# Patient Record
Sex: MALE | Race: BLACK OR AFRICAN AMERICAN | NOT HISPANIC OR LATINO
[De-identification: names, ages, dates, MRNs, and addresses within clinical notes are randomized per-mention and may not be internally consistent; named-entity substitution may affect disease eponyms.]

---

## 2020-05-14 ENCOUNTER — APPOINTMENT (OUTPATIENT)
Dept: ORTHOPEDIC SURGERY | Facility: CLINIC | Age: 30
End: 2020-05-14
Payer: COMMERCIAL

## 2020-05-14 VITALS — TEMPERATURE: 97.4 F

## 2020-05-14 PROCEDURE — 99203 OFFICE O/P NEW LOW 30 MIN: CPT

## 2020-05-19 NOTE — DISCUSSION/SUMMARY
[de-identified] : 30-year-old male with right knee pain\par \par The patient presents with clinical symptoms of diffuse anterior knee pain with dysfunction of the patellofemoral compartment. The patient's pain is likely mulitfactorial; including chondromalacia of the patellofemoral compartment, muscle weakness, limb malalignment, patella maltracking. \par \par I discussed at length the following nonoperative modalities of treatment for anterior knee pain/patellofemoral syndrome with the patient that includes anti-inflammatory medication, activity modification, and physical therapy. Physical therapy will include vastus medialis strengthening, close chain short arc quadriceps exercises as well as hip and core strengthening. Surgical intervention is typically reserved for cases refractory to nonoperative management with evidence of malalignment that may include debridement, lateral release versus patellar realignment surgery. \par \par Recommendation; activity modification, NSAIDs as prescribed, ice as needed.\par \par Follow up as needed.

## 2020-05-19 NOTE — PHYSICAL EXAM
[de-identified] : Oriented to time, place, person\par Mood: Normal\par Affect: Normal\par Appearance: Healthy, well appearing, no acute distress.\par Gait: Normal\par Assistive Devices: None\par \par Right knee exam\par \par Skin: Clean, dry, intact\par Inspection: No obvious malalignment, no masses, no swelling, no effusion\par Pulses: 2+ DP/PT pulses\par ROM: 0-135 degrees of flexion.  Mild anterior pain with deep knee flexion/extension.\par Tenderness: No MJLT. No LJLT.  Mild pain over the patella facets. No pain to the quadriceps tendon. No pain to the patella tendon. No posterior knee tenderness.\par Stability: Stable to varus, valgus. Negative lachman testing. Negative anterior drawer, negative posterior drawer.\par Strength: 5/5 Q/H/TA/GS/EHL, without atrophy\par Neuro: In tact to light touch throughout, DTR's normal\par Additional tests: Negative McMurrays test, Negative patellar grind test. \par  [de-identified] : Images were reviewed from  dated 5.12.20. \par \par 2 views right knee showed no evidence of bony injury, or martin dislocation. There is no underlying degenerative arthritic change seen. Overall alignment is maintained. Otherwise unremarkable.

## 2020-05-19 NOTE — HISTORY OF PRESENT ILLNESS
[de-identified] : 30 year old male  presents today with right knee pain since March 31, 2020. He said that he noticed he had pain after coming back from vacation and resuming his shift. Pain improves end of day and returns when starting back his shift. He is not taking pain medication. Reports weakness and stiffness. X-rays done at [facility].  Denies any mechanical symptoms to the knee. Denies radiation of pain.  Pain is localized through the anterior joint.\par \par The patient's past medical history, past surgical history, medications and allergies were reviewed by me today with the patient and documented accordingly. In addition, the patient's family and social history, which were noncontributory to this visit, were reviewed also.

## 2020-05-27 DIAGNOSIS — M25.561 PAIN IN RIGHT KNEE: ICD-10-CM

## 2021-04-27 LAB — SARS-COV-2 N GENE NPH QL NAA+PROBE: NOT DETECTED

## 2022-01-02 LAB — SARS-COV-2 N GENE NPH QL NAA+PROBE: DETECTED

## 2022-01-12 ENCOUNTER — TRANSCRIPTION ENCOUNTER (OUTPATIENT)
Age: 32
End: 2022-01-12

## 2022-03-22 ENCOUNTER — TRANSCRIPTION ENCOUNTER (OUTPATIENT)
Age: 32
End: 2022-03-22

## 2022-04-18 ENCOUNTER — APPOINTMENT (OUTPATIENT)
Dept: INTERNAL MEDICINE | Facility: CLINIC | Age: 32
End: 2022-04-18
Payer: COMMERCIAL

## 2022-04-22 ENCOUNTER — APPOINTMENT (OUTPATIENT)
Dept: INTERNAL MEDICINE | Facility: CLINIC | Age: 32
End: 2022-04-22
Payer: COMMERCIAL

## 2022-04-22 VITALS
WEIGHT: 117 LBS | SYSTOLIC BLOOD PRESSURE: 120 MMHG | DIASTOLIC BLOOD PRESSURE: 70 MMHG | HEART RATE: 90 BPM | BODY MASS INDEX: 19.03 KG/M2 | HEIGHT: 65.75 IN | OXYGEN SATURATION: 98 %

## 2022-04-22 LAB
ALBUMIN SERPL ELPH-MCNC: 4.9 G/DL
ALP BLD-CCNC: 95 U/L
ALT SERPL-CCNC: 23 U/L
ANION GAP SERPL CALC-SCNC: 13 MMOL/L
AST SERPL-CCNC: 26 U/L
BASOPHILS # BLD AUTO: 0.03 K/UL
BASOPHILS NFR BLD AUTO: 0.4 %
BILIRUB SERPL-MCNC: 0.6 MG/DL
BUN SERPL-MCNC: 12 MG/DL
CALCIUM SERPL-MCNC: 10.3 MG/DL
CHLORIDE SERPL-SCNC: 98 MMOL/L
CHOLEST SERPL-MCNC: 173 MG/DL
CO2 SERPL-SCNC: 30 MMOL/L
CREAT SERPL-MCNC: 0.97 MG/DL
EGFR: 108 ML/MIN/1.73M2
EOSINOPHIL # BLD AUTO: 0.04 K/UL
EOSINOPHIL NFR BLD AUTO: 0.6 %
ESTIMATED AVERAGE GLUCOSE: 117 MG/DL
GLUCOSE SERPL-MCNC: 100 MG/DL
HBA1C MFR BLD HPLC: 5.7 %
HCT VFR BLD CALC: 47 %
HCV AB SER QL: NONREACTIVE
HCV S/CO RATIO: 0.15 S/CO
HDLC SERPL-MCNC: 57 MG/DL
HGB BLD-MCNC: 15.2 G/DL
IMM GRANULOCYTES NFR BLD AUTO: 0.4 %
LDLC SERPL CALC-MCNC: 104 MG/DL
LYMPHOCYTES # BLD AUTO: 1.22 K/UL
LYMPHOCYTES NFR BLD AUTO: 17 %
MAN DIFF?: NORMAL
MCHC RBC-ENTMCNC: 28.6 PG
MCHC RBC-ENTMCNC: 32.3 GM/DL
MCV RBC AUTO: 88.3 FL
MONOCYTES # BLD AUTO: 0.68 K/UL
MONOCYTES NFR BLD AUTO: 9.5 %
NEUTROPHILS # BLD AUTO: 5.18 K/UL
NEUTROPHILS NFR BLD AUTO: 72.1 %
NONHDLC SERPL-MCNC: 116 MG/DL
PLATELET # BLD AUTO: 488 K/UL
POTASSIUM SERPL-SCNC: 4.8 MMOL/L
PROT SERPL-MCNC: 8.9 G/DL
RBC # BLD: 5.32 M/UL
RBC # FLD: 12.4 %
SODIUM SERPL-SCNC: 140 MMOL/L
T4 FREE SERPL-MCNC: 1.3 NG/DL
TRIGL SERPL-MCNC: 57 MG/DL
TSH SERPL-ACNC: 1.67 UIU/ML
WBC # FLD AUTO: 7.18 K/UL

## 2022-04-22 PROCEDURE — 99213 OFFICE O/P EST LOW 20 MIN: CPT

## 2022-04-23 ENCOUNTER — NON-APPOINTMENT (OUTPATIENT)
Age: 32
End: 2022-04-23

## 2022-04-23 LAB
C TRACH RRNA SPEC QL NAA+PROBE: NOT DETECTED
HIV1+2 AB SPEC QL IA.RAPID: NONREACTIVE
N GONORRHOEA RRNA SPEC QL NAA+PROBE: NOT DETECTED
SOURCE AMPLIFICATION: NORMAL
T PALLIDUM AB SER QL IA: NEGATIVE

## 2022-04-23 NOTE — ASSESSMENT
[FreeTextEntry1] : REBEKAH SAMUELS  is a 32 year old AA male  with history of childhood only asthma, knee pain presented today for concerns of possible inguinal hernia and left testicular tenderness, occasional rib cage tenderness.\par \par # occasional b/l inguinal and testicular tenderness\par Doubt hernia by exam.\par No gross evidence of STI by exam.\par Check STD screening including HIV, Hep C, Chlamydia/GC, syphilis\par Check routine blood work for loss of appetite and body weight.\par Recommended to use condoms all the time for safety.\par Check routine full lab for upcoming NPA.\par \par # hx of childhood asthma\par Hx of smoking Marijuana but no cigarette\par clear lung sound on exam,\par Rx sent for Albuterol rescue inhalator by pt's request\par \par f/u result. \par Pt should RTC to establish care with a  PCP.

## 2022-04-23 NOTE — HISTORY OF PRESENT ILLNESS
[FreeTextEntry1] : Visit to establish care with new primary care doctor\par  [de-identified] : Visit to establish care with new primary care doctor\par  [FreeTextEntry8] : REBEKAH SAMUELS  is a 32 year old AA male  with history of childhood only asthma, knee pain presented today for concerns of possible inguinal hernia and left testicular tenderness, occasional rib cage tenderness. Reportedly he saw another PCP 6 months ago and told all lab result was okay. He would soon return to our practice to establish care with a new PCP  since he was late for NPA appointment today. He wishes to take care of his acute issues at this encounter. \par \par He reported remote hx of chlamydia with Rx about 7 years ago. Also worried that he lost appetite and body weight. Today's bwt is 117lb comparing to 125 lbs which is pt's normal weight. Denies n/v/c/d or bowel habit change. He denies risky sexual behavior and stated that he's using condoms all the time. He has sexual relationship with only his girlfriend. Denies Uro, GI, dermatology issues. He was not sure which inguinal area had tenderness. He did not see a lump on inguinal area. Reported occasional testicle tenderness and pt worries if its a testicular cyst. \par His job as a  is physically demanding including lifting, carrying, pulling heavy objects. He was not sure if he pulled his back but rt circular rib cage was tender with deep breath or arm movement. For recent 1 month, he had dry cough.  He never smoked cigarettes but used to smoke Marijuana. He frequently donates blood and latest one was 3 month ago. Denies fatigue, fever, chills, CP, radiating pain to neck/shoulder/arm, SOB, leg swelling, n/v/c/d, or  abdominal pain at this time.

## 2022-04-23 NOTE — HISTORY OF PRESENT ILLNESS
[FreeTextEntry1] : Visit to establish care with new primary care doctor\par  [de-identified] : Visit to establish care with new primary care doctor\par  [FreeTextEntry8] : REBEKAH SAMUELS  is a 32 year old AA male  with history of childhood only asthma, knee pain presented today for concerns of possible inguinal hernia and left testicular tenderness, occasional rib cage tenderness. Reportedly he saw another PCP 6 months ago and told all lab result was okay. He would soon return to our practice to establish care with a new PCP  since he was late for NPA appointment today. He wishes to take care of his acute issues at this encounter. \par \par He reported remote hx of chlamydia with Rx about 7 years ago. Also worried that he lost appetite and body weight. Today's bwt is 117lb comparing to 125 lbs which is pt's normal weight. Denies n/v/c/d or bowel habit change. He denies risky sexual behavior and stated that he's using condoms all the time. He has sexual relationship with only his girlfriend. Denies Uro, GI, dermatology issues. He was not sure which inguinal area had tenderness. He did not see a lump on inguinal area. Reported occasional testicle tenderness and pt worries if its a testicular cyst. \par His job as a  is physically demanding including lifting, carrying, pulling heavy objects. He was not sure if he pulled his back but rt circular rib cage was tender with deep breath or arm movement. For recent 1 month, he had dry cough.  He never smoked cigarettes but used to smoke Marijuana. He frequently donates blood and latest one was 3 month ago. Denies fatigue, fever, chills, CP, radiating pain to neck/shoulder/arm, SOB, leg swelling, n/v/c/d, or  abdominal pain at this time.

## 2022-04-23 NOTE — PHYSICAL EXAM
[Normal] : soft, non-tender, non-distended, no masses palpated, no HSM and normal bowel sounds [Urethral Meatus] : meatus normal [Penis Abnormality] : normal uncircumcised penis [Scrotum] : the scrotum was normal [Epididymis] : the epididymides were normal [de-identified] : WDWN in NAD\par HEENT:  unremarkable\par Neck:  supple, no JVD, no LN\par Lungs:  CTA B/L, no W/R/R\par Heart:  Reg rate, +S1S2, no M/R/G\par Abdomen:  soft, NT, ND, +BS, no masses, no HS-megaly\par Genital: No pubic or genital lesions noted.\par Ext:  no C/C/E\par Neuro:  no focal deficits [de-identified] : no lymph node swelling, + tenderness on deep palpation on rt/lt inguinal area. No skin lesions. [de-identified] : No gross edema or tenderness on palapation

## 2022-04-23 NOTE — REVIEW OF SYSTEMS
[FreeTextEntry2] : Constitutional:  no fever and no chills. \par Eyes:  no discharge. \par HEENT:  no earache. \par Cardiovascular:  no chest pain, no palpitations and no lower extremity edema. \par Respiratory:  no shortness of breath, no wheezing and no cough. \par Gastrointestinal:  no abdominal pain, no nausea and no vomiting. \par Genitourinary:  no dysuria. \par Musculoskeletal:  no joint pain. \par Integumentary:  no itching. \par Neurological:  no headache. \par Psychiatric:  not suicidal. \par Hematologic/Lymphatic:  no easy bleeding. [FreeTextEntry6] : see HPI [FreeTextEntry8] : see HPI

## 2022-04-23 NOTE — PHYSICAL EXAM
[Normal] : soft, non-tender, non-distended, no masses palpated, no HSM and normal bowel sounds [Urethral Meatus] : meatus normal [Penis Abnormality] : normal uncircumcised penis [Scrotum] : the scrotum was normal [Epididymis] : the epididymides were normal [de-identified] : WDWN in NAD\par HEENT:  unremarkable\par Neck:  supple, no JVD, no LN\par Lungs:  CTA B/L, no W/R/R\par Heart:  Reg rate, +S1S2, no M/R/G\par Abdomen:  soft, NT, ND, +BS, no masses, no HS-megaly\par Genital: No pubic or genital lesions noted.\par Ext:  no C/C/E\par Neuro:  no focal deficits [de-identified] : no lymph node swelling, + tenderness on deep palpation on rt/lt inguinal area. No skin lesions. [de-identified] : No gross edema or tenderness on palapation

## 2022-04-27 ENCOUNTER — APPOINTMENT (OUTPATIENT)
Dept: DERMATOLOGY | Facility: CLINIC | Age: 32
End: 2022-04-27

## 2022-04-28 ENCOUNTER — APPOINTMENT (OUTPATIENT)
Dept: ULTRASOUND IMAGING | Facility: CLINIC | Age: 32
End: 2022-04-28
Payer: COMMERCIAL

## 2022-04-28 ENCOUNTER — OUTPATIENT (OUTPATIENT)
Dept: OUTPATIENT SERVICES | Facility: HOSPITAL | Age: 32
LOS: 1 days | End: 2022-04-28
Payer: COMMERCIAL

## 2022-04-28 DIAGNOSIS — N50.812 LEFT TESTICULAR PAIN: ICD-10-CM

## 2022-04-28 PROCEDURE — 76870 US EXAM SCROTUM: CPT

## 2022-04-28 PROCEDURE — 76870 US EXAM SCROTUM: CPT | Mod: 26

## 2022-05-04 ENCOUNTER — APPOINTMENT (OUTPATIENT)
Dept: INTERNAL MEDICINE | Facility: CLINIC | Age: 32
End: 2022-05-04
Payer: COMMERCIAL

## 2022-05-04 PROCEDURE — 99212 OFFICE O/P EST SF 10 MIN: CPT

## 2022-05-04 NOTE — HISTORY OF PRESENT ILLNESS
[de-identified] : RTC to review results of recent US and blood tests. \par Recent lab results reviewed.\par All normal. \par US was normal as well.  Groin pain subsequently resolve.d

## 2022-05-09 ENCOUNTER — APPOINTMENT (OUTPATIENT)
Dept: INTERNAL MEDICINE | Facility: CLINIC | Age: 32
End: 2022-05-09

## 2022-10-06 ENCOUNTER — APPOINTMENT (OUTPATIENT)
Dept: INTERNAL MEDICINE | Facility: CLINIC | Age: 32
End: 2022-10-06

## 2022-10-06 DIAGNOSIS — J45.909 UNSPECIFIED ASTHMA, UNCOMPLICATED: ICD-10-CM

## 2022-10-06 PROCEDURE — 99213 OFFICE O/P EST LOW 20 MIN: CPT

## 2022-10-07 NOTE — HISTORY OF PRESENT ILLNESS
[Follow-Up - Routine Clinic] : a routine clinic follow-up of [Dyspnea on Exertion] : dyspnea on exertion [Wheezing] : wheezing [Chest Tightness] : chest tightness [Productive Cough] : no productive cough [Non-Productive Cough] : non-productive cough [Chest Pain] : no chest pain [Currently Experiencing] : The patient is currently experiencing symptoms. [Sudden] : sudden [___ Week(s) Ago] : [unfilled] week(s) ago [Change in Weather] : a change in weather [Orthopnea] : no orthopnea [Fever] : no fever [Chills] : no chills [Nasal Congestion] : no nasal congestion [Sneezing] : no sneezing

## 2022-10-07 NOTE — ASSESSMENT
[FreeTextEntry1] : Responded well to nebulized albuterol\par \par 24 minutes spent in preparation of this visit, including review of previous notes and test results, interview and examination of patient, discussion of plan, arranging for appropriate testing and treatment, and documentation.\par

## 2022-10-26 ENCOUNTER — APPOINTMENT (OUTPATIENT)
Dept: UROLOGY | Facility: CLINIC | Age: 32
End: 2022-10-26

## 2022-10-26 DIAGNOSIS — Z78.9 OTHER SPECIFIED HEALTH STATUS: ICD-10-CM

## 2022-10-26 PROCEDURE — 99203 OFFICE O/P NEW LOW 30 MIN: CPT

## 2022-10-26 RX ORDER — ALBUTEROL SULFATE 90 UG/1
108 (90 BASE) INHALANT RESPIRATORY (INHALATION)
Qty: 2 | Refills: 1 | Status: COMPLETED | COMMUNITY
Start: 2022-04-22 | End: 2022-10-26

## 2022-10-26 RX ORDER — NAPROXEN 500 MG/1
500 TABLET ORAL
Qty: 30 | Refills: 0 | Status: COMPLETED | COMMUNITY
Start: 2020-05-14 | End: 2022-10-26

## 2022-10-27 NOTE — ASSESSMENT
[FreeTextEntry1] : Exam findings noted and discussed with the patient.\par Reviewed potential etiologies including cyst and neoplasm.\par Recommend Scrotal US and will call with results.

## 2022-10-27 NOTE — HISTORY OF PRESENT ILLNESS
[FreeTextEntry1] : Patient is a 32 year old man comes in today for bilateral testicular nodules\par He first noticed them about 4 - 5 months ago accompanied with right groin pain \par He feels that the right side is larger, slightly bigger than a quarter and feels the left is about the size of a sharmin. \par \par Denies any bothersome urinary issues. Denies any gross hematuria \par Denies any childhood  issues \par \par Scrotal US 4/28/22: Normal Testis, normal epididymis.\par \par Feels that there has been growth in bilateral scrotal masses over the past 6 months.

## 2022-10-27 NOTE — PHYSICAL EXAM
[General Appearance - Well Developed] : well developed [Normal Appearance] : normal appearance [Edema] : no peripheral edema [Exaggerated Use Of Accessory Muscles For Inspiration] : no accessory muscle use [Abdomen Tenderness] : non-tender [Penis Abnormality] : normal uncircumcised penis [Normal Station and Gait] : the gait and station were normal for the patient's age [] : no rash [No Focal Deficits] : no focal deficits [Oriented To Time, Place, And Person] : oriented to person, place, and time [Affect] : the affect was normal [Urethral Meatus] : meatus normal [Testes Tenderness] : no tenderness of the testes [Testes Mass (___cm)] : there were no testicular masses [FreeTextEntry1] : Bilateral nodular scrotal masses, appears to be arising from the epididymis.  Largest measure about 1.5 cm. [No Palpable Adenopathy] : no palpable adenopathy

## 2022-10-30 ENCOUNTER — OUTPATIENT (OUTPATIENT)
Dept: OUTPATIENT SERVICES | Facility: HOSPITAL | Age: 32
LOS: 1 days | End: 2022-10-30
Payer: COMMERCIAL

## 2022-10-30 ENCOUNTER — APPOINTMENT (OUTPATIENT)
Dept: ULTRASOUND IMAGING | Facility: IMAGING CENTER | Age: 32
End: 2022-10-30

## 2022-10-30 DIAGNOSIS — Z00.8 ENCOUNTER FOR OTHER GENERAL EXAMINATION: ICD-10-CM

## 2022-10-30 DIAGNOSIS — N50.3 CYST OF EPIDIDYMIS: ICD-10-CM

## 2022-10-30 PROCEDURE — 76870 US EXAM SCROTUM: CPT

## 2022-10-30 PROCEDURE — 76870 US EXAM SCROTUM: CPT | Mod: 26

## 2022-11-03 ENCOUNTER — OUTPATIENT (OUTPATIENT)
Dept: OUTPATIENT SERVICES | Facility: HOSPITAL | Age: 32
LOS: 1 days | End: 2022-11-03

## 2022-11-03 VITALS
HEART RATE: 81 BPM | TEMPERATURE: 97 F | SYSTOLIC BLOOD PRESSURE: 100 MMHG | OXYGEN SATURATION: 97 % | WEIGHT: 115.96 LBS | RESPIRATION RATE: 16 BRPM | HEIGHT: 67 IN | DIASTOLIC BLOOD PRESSURE: 70 MMHG

## 2022-11-03 DIAGNOSIS — N50.3 CYST OF EPIDIDYMIS: ICD-10-CM

## 2022-11-03 LAB
ANION GAP SERPL CALC-SCNC: 12 MMOL/L — SIGNIFICANT CHANGE UP (ref 7–14)
BUN SERPL-MCNC: 14 MG/DL — SIGNIFICANT CHANGE UP (ref 7–23)
CALCIUM SERPL-MCNC: 9 MG/DL — SIGNIFICANT CHANGE UP (ref 8.4–10.5)
CHLORIDE SERPL-SCNC: 104 MMOL/L — SIGNIFICANT CHANGE UP (ref 98–107)
CO2 SERPL-SCNC: 25 MMOL/L — SIGNIFICANT CHANGE UP (ref 22–31)
CREAT SERPL-MCNC: 0.95 MG/DL — SIGNIFICANT CHANGE UP (ref 0.5–1.3)
EGFR: 110 ML/MIN/1.73M2 — SIGNIFICANT CHANGE UP
GLUCOSE SERPL-MCNC: 91 MG/DL — SIGNIFICANT CHANGE UP (ref 70–99)
HCT VFR BLD CALC: 43.2 % — SIGNIFICANT CHANGE UP (ref 39–50)
HGB BLD-MCNC: 14.4 G/DL — SIGNIFICANT CHANGE UP (ref 13–17)
MCHC RBC-ENTMCNC: 28.6 PG — SIGNIFICANT CHANGE UP (ref 27–34)
MCHC RBC-ENTMCNC: 33.3 GM/DL — SIGNIFICANT CHANGE UP (ref 32–36)
MCV RBC AUTO: 85.9 FL — SIGNIFICANT CHANGE UP (ref 80–100)
NRBC # BLD: 0 /100 WBCS — SIGNIFICANT CHANGE UP (ref 0–0)
NRBC # FLD: 0 K/UL — SIGNIFICANT CHANGE UP (ref 0–0)
PLATELET # BLD AUTO: 328 K/UL — SIGNIFICANT CHANGE UP (ref 150–400)
POTASSIUM SERPL-MCNC: 4.1 MMOL/L — SIGNIFICANT CHANGE UP (ref 3.5–5.3)
POTASSIUM SERPL-SCNC: 4.1 MMOL/L — SIGNIFICANT CHANGE UP (ref 3.5–5.3)
RBC # BLD: 5.03 M/UL — SIGNIFICANT CHANGE UP (ref 4.2–5.8)
RBC # FLD: 13.2 % — SIGNIFICANT CHANGE UP (ref 10.3–14.5)
SODIUM SERPL-SCNC: 141 MMOL/L — SIGNIFICANT CHANGE UP (ref 135–145)
WBC # BLD: 4.18 K/UL — SIGNIFICANT CHANGE UP (ref 3.8–10.5)
WBC # FLD AUTO: 4.18 K/UL — SIGNIFICANT CHANGE UP (ref 3.8–10.5)

## 2022-11-03 RX ORDER — SODIUM CHLORIDE 9 MG/ML
1000 INJECTION, SOLUTION INTRAVENOUS
Refills: 0 | Status: DISCONTINUED | OUTPATIENT
Start: 2022-11-08 | End: 2022-11-22

## 2022-11-03 RX ORDER — ALBUTEROL 90 UG/1
2 AEROSOL, METERED ORAL
Qty: 0 | Refills: 0 | DISCHARGE

## 2022-11-03 NOTE — H&P PST ADULT - NSICDXPASTMEDICALHX_GEN_ALL_CORE_FT
PAST MEDICAL HISTORY:  Asthma     No pertinent past medical history      PAST MEDICAL HISTORY:  Asthma     Cyst of epididymis

## 2022-11-03 NOTE — H&P PST ADULT - NSANTHOSAYNRD_GEN_A_CORE
No. [place] screening performed.  STOP BANG Legend: 0-2 = LOW Risk; 3-4 = INTERMEDIATE Risk; 5-8 = HIGH Risk

## 2022-11-03 NOTE — H&P PST ADULT - PROBLEM SELECTOR PLAN 1
Pt is scheduled for bilateral scrotal exploration, resection of bilateral epididymal masses on 11/8/22.  Verbal and written pre op instructions reviewed with patient and pt able to verbalize understanding.  Pt instructed to follow surgeon's guidelines regarding COVID testing preop.

## 2022-11-03 NOTE — H&P PST ADULT - HISTORY OF PRESENT ILLNESS
33 yo male with preop dx. cyst of epididymis presents to pre surgical testing.  Pt reports he noticed masses on his testes 5-6 months ago, pt now complains of new right pelvic pain, s/p MRI.   Pt is scheduled for bilateral scrotal exploration, resection of bilateral epididymal masses.

## 2022-11-03 NOTE — H&P PST ADULT - OCCUPATION
As discussed, Sylvia who was seen on 8/17 called her insurance company to get a new CPap because the one she called is on recall.  They told her they need a new order for the C-Pap.  She wants to know if Dr Sandoval can order for her.  Please advise.  Thank you!!  
Pt states her PCP gave her the original order for the CPAP machine. Informed pt she would need to contact her PCP for the CPAP machine as we do not order or manage those. Pt verbalizes understanding and states she will call her PCP   
materials [facility], [place]

## 2022-11-07 ENCOUNTER — TRANSCRIPTION ENCOUNTER (OUTPATIENT)
Age: 32
End: 2022-11-07

## 2022-11-07 NOTE — ASU PATIENT PROFILE, ADULT - AS SC BRADEN MOBILITY
Detail Level: Simple
Instructions: This plan will send the code FBSE to the PM system.  DO NOT or CHANGE the price.
Price (Do Not Change): 0.00
(4) no limitation

## 2022-11-07 NOTE — ASU PATIENT PROFILE, ADULT - FALL HARM RISK - UNIVERSAL INTERVENTIONS
Bed in lowest position, wheels locked, appropriate side rails in place/Call bell, personal items and telephone in reach/Instruct patient to call for assistance before getting out of bed or chair/Non-slip footwear when patient is out of bed/[place] to call system/Physically safe environment - no spills, clutter or unnecessary equipment/Purposeful Proactive Rounding/Room/bathroom lighting operational, light cord in reach

## 2022-11-08 ENCOUNTER — TRANSCRIPTION ENCOUNTER (OUTPATIENT)
Age: 32
End: 2022-11-08

## 2022-11-08 ENCOUNTER — APPOINTMENT (OUTPATIENT)
Dept: UROLOGY | Facility: HOSPITAL | Age: 32
End: 2022-11-08

## 2022-11-08 ENCOUNTER — RESULT REVIEW (OUTPATIENT)
Age: 32
End: 2022-11-08

## 2022-11-08 ENCOUNTER — OUTPATIENT (OUTPATIENT)
Dept: OUTPATIENT SERVICES | Facility: HOSPITAL | Age: 32
LOS: 1 days | Discharge: HOME | End: 2022-11-08

## 2022-11-08 VITALS
SYSTOLIC BLOOD PRESSURE: 114 MMHG | RESPIRATION RATE: 18 BRPM | HEART RATE: 76 BPM | TEMPERATURE: 98 F | OXYGEN SATURATION: 100 % | WEIGHT: 115.96 LBS | HEIGHT: 67 IN | DIASTOLIC BLOOD PRESSURE: 73 MMHG

## 2022-11-08 VITALS
HEART RATE: 62 BPM | RESPIRATION RATE: 15 BRPM | OXYGEN SATURATION: 100 % | SYSTOLIC BLOOD PRESSURE: 119 MMHG | DIASTOLIC BLOOD PRESSURE: 82 MMHG

## 2022-11-08 DIAGNOSIS — N50.3 CYST OF EPIDIDYMIS: ICD-10-CM

## 2022-11-08 LAB — SARS-COV-2 N GENE NPH QL NAA+PROBE: NOT DETECTED

## 2022-11-08 PROCEDURE — 54830 REMOVE EPIDIDYMIS LESION: CPT | Mod: 50

## 2022-11-08 RX ORDER — SODIUM CHLORIDE 9 MG/ML
1000 INJECTION, SOLUTION INTRAVENOUS
Refills: 0 | Status: DISCONTINUED | OUTPATIENT
Start: 2022-11-08 | End: 2022-11-22

## 2022-11-08 RX ORDER — FENTANYL CITRATE 50 UG/ML
50 INJECTION INTRAVENOUS
Refills: 0 | Status: DISCONTINUED | OUTPATIENT
Start: 2022-11-08 | End: 2022-11-08

## 2022-11-08 RX ORDER — OXYCODONE HYDROCHLORIDE 5 MG/1
5 TABLET ORAL ONCE
Refills: 0 | Status: DISCONTINUED | OUTPATIENT
Start: 2022-11-08 | End: 2022-11-08

## 2022-11-08 RX ORDER — FENTANYL CITRATE 50 UG/ML
25 INJECTION INTRAVENOUS
Refills: 0 | Status: DISCONTINUED | OUTPATIENT
Start: 2022-11-08 | End: 2022-11-08

## 2022-11-08 RX ORDER — ONDANSETRON 8 MG/1
4 TABLET, FILM COATED ORAL ONCE
Refills: 0 | Status: DISCONTINUED | OUTPATIENT
Start: 2022-11-08 | End: 2022-11-22

## 2022-11-08 RX ADMIN — SODIUM CHLORIDE 125 MILLILITER(S): 9 INJECTION, SOLUTION INTRAVENOUS at 16:51

## 2022-11-08 RX ADMIN — OXYCODONE HYDROCHLORIDE 5 MILLIGRAM(S): 5 TABLET ORAL at 16:51

## 2022-11-08 NOTE — ASU DISCHARGE PLAN (ADULT/PEDIATRIC) - ASU DC SPECIAL INSTRUCTIONSFT
WOUND CARE: Your sutures will dissolve on their own. You should apply bacitracin (available over the counter) to the incision 3 times a day for the first week. It is normal to see swelling. Some spotting or bleeding from the incision is normal. If the bleeding worsens or saturates the dressing, please call your urologist. You may remove your supportive underwear on Thursday.  BATHING: You may shower after the underwear is removed. Do not soak in bathtub/pool/etc until you are cleared by your urologist at your post-operative appointment.  DIET: You may resume your regular diet and regular medication regimen.  PAIN: You may take Tylenol (acetaminophen) 650-975mg and/or Motrin (ibuprofen) 400-600mg, both available over the counter, for pain every 6 hours as needed. Do not exceed 4000mg of Tylenol (acetaminophen) daily. You may alternate these medications such that you take one or the other every 3 hours for around the clock pain coverage.  ANTIBIOTICS: You do not need antibiotics.  STOOL SOFTENERS: Do not allow yourself to become constipated as straining may cause bleeding. Take stool softeners or a laxative (ex. Miralax, Colace, Senokot, ExLax, etc), available over the counter, if needed.  ACTIVITY: No heavy lifting, strenuous exercise, straddle activities (bicycle), or sexual activity (including masturbation) until you are evaluated at your post-operative appointment. Otherwise, you may return to your usual level of physical activity.  FOLLOW-UP: If you did not already schedule your post-operative appointment, please call your urologist to schedule and follow-up appointment.  CALL YOUR UROLOGIST IF: You have any bleeding that does not stop, inability to void >8 hours, fever over 100.4 F, chills, persistent nausea/vomiting, changes in your incision concerning for infection, or if your pain is not controlled on your discharge pain medications.

## 2022-11-08 NOTE — ASU DISCHARGE PLAN (ADULT/PEDIATRIC) - NS MD DC FALL RISK RISK
For information on Fall & Injury Prevention, visit: [email or web address] OR  [email or web address] OR  https://www.cdc.gov/steadi/patient.html

## 2022-11-08 NOTE — ASU DISCHARGE PLAN (ADULT/PEDIATRIC) - NURSING INSTRUCTIONS
DO NOT take any Tylenol (Acetaminophen) or narcotics containing Tylenol until after 8pm. You received Tylenol during your operation and it can cause damage to your liver if too much is taken within a 24 hour time period.

## 2022-11-08 NOTE — PACU DISCHARGE NOTE - COMMENTS
T(C): 36.3 (11-08-22 @ 17:31), Max: 36.5 (11-08-22 @ 10:53)  HR: 62 (11-08-22 @ 17:51) (61 - 85)  BP: 119/82 (11-08-22 @ 17:51) (113/78 - 127/79)  RR: 15 (11-08-22 @ 17:51) (15 - 20)  SpO2: 100% (11-08-22 @ 17:51) (99% - 100%)    Vital signs stable, non-labored breathing with adequate oxygen saturation on room air. Pain and nausea are controlled. All questions answered. Stable for discharge home with an escort.

## 2022-11-08 NOTE — ASU DISCHARGE PLAN (ADULT/PEDIATRIC) - CARE PROVIDER_API CALL
Bayron Ahuja)  Urology  [address] [place] [address], [place] [address]  Phone: [phone]  Fax: [phone]  Established Patient  Follow Up Time:

## 2022-11-09 LAB
NIGHT BLUE STAIN TISS: SIGNIFICANT CHANGE UP
SPECIMEN SOURCE: SIGNIFICANT CHANGE UP

## 2022-11-16 LAB — SURGICAL PATHOLOGY STUDY: SIGNIFICANT CHANGE UP

## 2022-12-07 LAB
CULTURE RESULTS: SIGNIFICANT CHANGE UP
SPECIMEN SOURCE: SIGNIFICANT CHANGE UP

## 2022-12-21 LAB
CULTURE RESULTS: SIGNIFICANT CHANGE UP
SPECIMEN SOURCE: SIGNIFICANT CHANGE UP

## 2023-01-22 NOTE — H&P PST ADULT - NEGATIVE CARDIOVASCULAR SYMPTOMS
Patient is a 88y old  Female from home, lives with daughter, uses a walker, with medical history significant for HTN, HLD, DM, Osteoporosis, Stage II B Gastric Adenocarcinoma s/p distal gastrectomy in 2015 on active chemo (follows [facility] Dr Adams), Early Multiple myeloma, surgical history of ASHU w/BSO, Cholecystectomy, now  presents  to the ER with shortness of breath with cough and productive sputum for 2 days as well as chest tightness. Patient states she feels fine now but this morning when she was having an endoscopy she was increasingly short of breath and was sent to the ER. On admission, she found to have no fever, but hypoxia, 86% in Room air, requiring supplemental oxygenation. Also found to have positive COVID PCR and B/L Pulmonary embolism. She has started on Remdesivir and Dexamethasone, and the ID consult requested to assist with further evaluation and antibiotic management.      REVIEW OF SYSTEMS: Total of twelve systems have been reviewed  and found to be negative unless mentioned in HPI        PAST MEDICAL & SURGICAL HISTORY:  HTN (hypertension)  DM (diabetes mellitus)  Hypercholesteremia  Gastric adenocarcinoma  s/p resection  Vertigo  Dementia  S/P hysterectomy  S/P tubal ligation        SOCIAL HISTORY  Alcohol: Does not drink  Tobacco: Does not smoke  Illicit substance use: None      FAMILY HISTORY: Non contributory to the present illness        ALLERGIES: gabapentin (Unknown)        Vital Signs Last 24 Hrs  T(C): 36.7 (22 Jan 2023 10:40), Max: 36.8 (21 Jan 2023 20:48)  T(F): 98 (22 Jan 2023 10:40), Max: 98.3 (21 Jan 2023 20:48)  HR: 82 (22 Jan 2023 10:40) (72 - 90)  BP: 120/65 (22 Jan 2023 10:40) (120/65 - 148/55)  BP(mean): --  RR: 18 (22 Jan 2023 10:40) (18 - 23)  SpO2: 94% (22 Jan 2023 10:40) (94% - 97%)    Parameters below as of 22 Jan 2023 10:40  Patient On (Oxygen Delivery Method): room air  O2 Flow (L/min): 3      PHYSICAL EXAM:  GENERAL: Not in distress   CHEST/LUNG: Not using accessory muscles  HEART: s1 and s2 present  ABDOMEN:  Nontender and  Nondistended  EXTREMITIES: No pedal  edema  CNS: Awake and Alert      LABS:                        10.8   3.66  )-----------( 182      ( 22 Jan 2023 06:15 )             32.0         01-22    142  |  110<H>  |  23<H>  ----------------------------<  152<H>  4.2   |  18<L>  |  0.80    Ca    9.3      22 Jan 2023 06:15  Mg     1.7     01-21    TPro  6.6  /  Alb  3.1<L>  /  TBili  0.3  /  DBili  x   /  AST  11  /  ALT  20  /  AlkPhos  49  01-21    PT/INR - ( 21 Jan 2023 11:30 )   PT: 13.1 sec;   INR: 1.10 ratio       PTT - ( 22 Jan 2023 10:29 )  PTT:62.5 sec        CAPILLARY BLOOD GLUCOSE  POCT Blood Glucose.: 182 mg/dL (22 Jan 2023 11:43)  POCT Blood Glucose.: 170 mg/dL (22 Jan 2023 07:56)  POCT Blood Glucose.: 160 mg/dL (21 Jan 2023 22:33)    ABG - ( 21 Jan 2023 11:53 )  pH, Arterial: 7.45  pH, Blood: x     /  pCO2: 24    /  pO2: 315   / HCO3: 17    / Base Excess: -5.5  /  SaO2: 98            MEDICATIONS  (STANDING):  dexAMETHasone  Injectable 6 milliGRAM(s) IV Push daily  heparin  Infusion.  Unit(s)/Hr (9 mL/Hr) IV Continuous <Continuous>  influenza  Vaccine (HIGH DOSE) 0.7 milliLiter(s) IntraMuscular once  insulin lispro (ADMELOG) corrective regimen sliding scale   SubCutaneous three times a day before meals  insulin lispro (ADMELOG) corrective regimen sliding scale   SubCutaneous at bedtime  remdesivir  IVPB 100 milliGRAM(s) IV Intermittent every 24 hours  remdesivir  IVPB   IV Intermittent     MEDICATIONS  (PRN):  acetaminophen     Tablet .. 650 milliGRAM(s) Oral every 6 hours PRN Temp greater or equal to 38C (100.4F), Mild Pain (1 - 3)          RADIOLOGY & ADDITIONAL TESTS:    1/21/23: Xray Chest 1 View- PORTABLE-Urgent (01.21.23 @ 13:16) No acute pulmonary pathology.      1/21/23: CT Angio Chest PE Protocol w/ IV Cont (01.21.23 @ 13:02) >    1.  Bilateral pulmonary emboli.  2.  Likely underlying right heart strain.  3.  The findings were discussed with and read back verification obtained   from Dr. Calvillo on 1/21/2023 at 1315 hours.          MICROBIOLOGY DATA:    Respiratory Viral Panel with COVID-19 by ALEX (01.21.23 @ 11:25)   Rapid RVP Result: Detected   SARS-CoV-2: Detected:              no chest pain/no palpitations/no dyspnea on exertion

## 2023-01-31 PROBLEM — N50.3 CYST OF EPIDIDYMIS: Chronic | Status: ACTIVE | Noted: 2022-11-03

## 2023-01-31 PROBLEM — J45.909 UNSPECIFIED ASTHMA, UNCOMPLICATED: Chronic | Status: ACTIVE | Noted: 2022-11-03

## 2023-02-07 ENCOUNTER — APPOINTMENT (OUTPATIENT)
Dept: UROLOGY | Facility: CLINIC | Age: 33
End: 2023-02-07
Payer: COMMERCIAL

## 2023-02-07 ENCOUNTER — OUTPATIENT (OUTPATIENT)
Dept: OUTPATIENT SERVICES | Facility: HOSPITAL | Age: 33
LOS: 1 days | End: 2023-02-07
Payer: COMMERCIAL

## 2023-02-07 VITALS
HEART RATE: 78 BPM | WEIGHT: 130 LBS | BODY MASS INDEX: 21.14 KG/M2 | HEIGHT: 65.75 IN | RESPIRATION RATE: 16 BRPM | SYSTOLIC BLOOD PRESSURE: 120 MMHG | DIASTOLIC BLOOD PRESSURE: 83 MMHG

## 2023-02-07 DIAGNOSIS — R35.0 FREQUENCY OF MICTURITION: ICD-10-CM

## 2023-02-07 DIAGNOSIS — N50.3 CYST OF EPIDIDYMIS: ICD-10-CM

## 2023-02-07 PROCEDURE — 99213 OFFICE O/P EST LOW 20 MIN: CPT

## 2023-02-07 PROCEDURE — 76870 US EXAM SCROTUM: CPT

## 2023-02-07 PROCEDURE — 76870 US EXAM SCROTUM: CPT | Mod: 26

## 2023-02-26 NOTE — HISTORY OF PRESENT ILLNESS
HPI .  Patient has a history of anxiety, depression, stroke, heart failure, hypertension, pacemaker, and coronary disease. Patient presents with 3 days of frontal headache. She says she was at rest watching cartoons when the headache started. Pain is present for hours at a time but sometimes resolves. She has some periorbital pain. She took  Six 81 mg aspirins. She denies tinnitus. She has had no vomiting. She has chronic visual issues that improves when she has her reading glasses but no new visual symptoms. Patient says she had similar symptoms in the past that went away with an IV medication. She had a CTA of her head neck 3 months ago. She says she does not want any x-rays or scans today. Past Medical History:   Diagnosis Date    Anxiety     CAD (coronary artery disease)     [facility] failure ([facility]     Hypertension     Psychiatric disorder     depression    Stroke ([facility]     Stroke ([facility] [facility]     1970       Past Surgical History:   Procedure Laterality Date    HX GYN      tubal ligation    HX PACEMAKER PLACEMENT Left 2018    VT CARDIAC SURG PROCEDURE UNLIST      VT INS NEW/RPLCMT PRM PM W/TRANSV ELTRD ATRIAL&VENT  1/7/2019              History reviewed. No pertinent family history.     Social History     Socioeconomic History    Marital status:      Spouse name: Not on file    Number of children: Not on file    Years of education: Not on file    Highest education level: Not on file   Occupational History    Not on file   Social Needs    Financial resource strain: Not on file    Food insecurity     Worry: Not on file     Inability: Not on file    Transportation needs     Medical: Not on file     Non-medical: Not on file   Tobacco Use    Smoking status: Current Every Day Smoker     Types: Cigarettes    Smokeless tobacco: Never Used    Tobacco comment: 12/2019   Substance and Sexual Activity    Alcohol use: Not Currently     Frequency: Never    Drug use: Yes     Types: Marijuana, Cocaine     Comment: new years kwadwo - last use    Sexual activity: Never   Lifestyle    Physical activity     Days per week: Not on file     Minutes per session: Not on file    Stress: Not on file   Relationships    Social connections     Talks on phone: Not on file     Gets together: Not on file     Attends Restorationism service: Not on file     Active member of club or organization: Not on file     Attends meetings of clubs or organizations: Not on file     Relationship status: Not on file    Intimate partner violence     Fear of current or ex partner: Not on file     Emotionally abused: Not on file     Physically abused: Not on file     Forced sexual activity: Not on file   Other Topics Concern    Not on file   Social History Narrative    ** Merged History Encounter **              ALLERGIES: Patient has no known allergies. Review of Systems   All other systems reviewed and are negative. Vitals:    03/15/21 1537   BP: 122/70   Pulse: 93   Resp: 16   Temp: 97 °F (36.1 °C)   SpO2: 97%            Physical Exam  Vitals signs and nursing note reviewed. Constitutional:       Appearance: She is well-developed. HENT:      Head: Normocephalic and atraumatic. Eyes:      Pupils: Pupils are equal, round, and reactive to light. Neck:      Musculoskeletal: Normal range of motion and neck supple. Cardiovascular:      Rate and Rhythm: Normal rate and regular rhythm. Heart sounds: Normal heart sounds. No murmur. No friction rub. No gallop. Pulmonary:      Effort: Pulmonary effort is normal. No respiratory distress. Breath sounds: No wheezing or rales. Abdominal:      Palpations: Abdomen is soft. Tenderness: There is no abdominal tenderness. There is no rebound. Musculoskeletal: Normal range of motion. General: No tenderness. Skin:     Findings: No erythema. Neurological:      Mental Status: She is alert. Cranial Nerves: No cranial nerve deficit.       Comments: Motor; [FreeTextEntry1] : Patient is a 32 year old man comes in today for bilateral testicular nodules\par He first noticed them accompanied with right groin pain \par Right side is larger, slightly bigger than a quarter and feels the left is about the size of a sharmin. \par \par Scrotal US 4/28/22: Normal Testis, normal epididymis.\par Scrotal US 10/30/2022: Bilateral solid masses associated with both epididymis.\par \par Underwent bilateral scrotal exploration with excision of epididymal masses on 11/8/2022.  Path: Acute/chronic granulomatous epididymitis. AFB stains were negative.\par \par Returns today with feeling that bilateral scrotal nodules have grown back.\par \par Scrotal US today. Images reviewed. 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 \par  symmetric   Psychiatric:         Behavior: Behavior normal.          MDM       Procedures          Note: Headache is less. Patient wants to go home. Marcia Hauser MD  7:16 PM

## 2023-02-26 NOTE — PHYSICAL EXAM
[General Appearance - Well Developed] : well developed [Normal Appearance] : normal appearance [General Appearance - Well Nourished] : well nourished [Well Groomed] : well groomed [General Appearance - In No Acute Distress] : no acute distress [Abdomen Soft] : soft [Abdomen Tenderness] : non-tender [Costovertebral Angle Tenderness] : no ~M costovertebral angle tenderness [FreeTextEntry1] : Bilateral scrotal fullness, epididymal cyst/mass.  Non tender.

## 2023-02-26 NOTE — ASSESSMENT
[FreeTextEntry1] : US findings reviewed.\par Unclear etiology of recurrent inflammatory nodules.\par Recommend MRI testis w/wo IV contrast.\par Will call with results.

## 2023-02-27 DIAGNOSIS — N50.3 CYST OF EPIDIDYMIS: ICD-10-CM

## 2023-02-27 DIAGNOSIS — N45.1 EPIDIDYMITIS: ICD-10-CM

## 2023-03-03 ENCOUNTER — OUTPATIENT (OUTPATIENT)
Dept: OUTPATIENT SERVICES | Facility: HOSPITAL | Age: 33
LOS: 1 days | End: 2023-03-03
Payer: COMMERCIAL

## 2023-03-03 ENCOUNTER — APPOINTMENT (OUTPATIENT)
Dept: MRI IMAGING | Facility: IMAGING CENTER | Age: 33
End: 2023-03-03
Payer: COMMERCIAL

## 2023-03-03 DIAGNOSIS — N45.1 EPIDIDYMITIS: ICD-10-CM

## 2023-03-03 DIAGNOSIS — N50.3 CYST OF EPIDIDYMIS: ICD-10-CM

## 2023-03-03 PROCEDURE — 72197 MRI PELVIS W/O & W/DYE: CPT | Mod: 26

## 2023-03-03 PROCEDURE — A9585: CPT

## 2023-03-03 PROCEDURE — 72197 MRI PELVIS W/O & W/DYE: CPT

## 2023-03-27 DIAGNOSIS — R59.0 LOCALIZED ENLARGED LYMPH NODES: ICD-10-CM

## 2023-03-27 DIAGNOSIS — N45.1 EPIDIDYMITIS: ICD-10-CM

## 2023-03-27 RX ORDER — METHYLPREDNISOLONE 4 MG/1
4 TABLET ORAL
Qty: 1 | Refills: 0 | Status: COMPLETED | COMMUNITY
Start: 2023-03-27 | End: 2023-04-10

## 2023-03-28 ENCOUNTER — RESULT REVIEW (OUTPATIENT)
Age: 33
End: 2023-03-28

## 2023-04-04 ENCOUNTER — APPOINTMENT (OUTPATIENT)
Dept: CT IMAGING | Facility: IMAGING CENTER | Age: 33
End: 2023-04-04
Payer: COMMERCIAL

## 2023-04-04 ENCOUNTER — OUTPATIENT (OUTPATIENT)
Dept: OUTPATIENT SERVICES | Facility: HOSPITAL | Age: 33
LOS: 1 days | End: 2023-04-04
Payer: COMMERCIAL

## 2023-04-04 DIAGNOSIS — R59.0 LOCALIZED ENLARGED LYMPH NODES: ICD-10-CM

## 2023-04-04 PROCEDURE — 74177 CT ABD & PELVIS W/CONTRAST: CPT

## 2023-04-04 PROCEDURE — 74177 CT ABD & PELVIS W/CONTRAST: CPT | Mod: 26

## 2023-04-04 PROCEDURE — 71260 CT THORAX DX C+: CPT

## 2023-04-04 PROCEDURE — 71260 CT THORAX DX C+: CPT | Mod: 26

## 2023-04-05 ENCOUNTER — NON-APPOINTMENT (OUTPATIENT)
Age: 33
End: 2023-04-05

## 2023-04-05 LAB
ALBUMIN SERPL ELPH-MCNC: 4.2 G/DL
ALP BLD-CCNC: 134 U/L
ALT SERPL-CCNC: 31 U/L
ANION GAP SERPL CALC-SCNC: 12 MMOL/L
AST SERPL-CCNC: 30 U/L
BASOPHILS # BLD AUTO: 0.02 K/UL
BASOPHILS NFR BLD AUTO: 0.4 %
BILIRUB SERPL-MCNC: 0.3 MG/DL
BUN SERPL-MCNC: 11 MG/DL
CALCIUM SERPL-MCNC: 9.6 MG/DL
CHLORIDE SERPL-SCNC: 97 MMOL/L
CO2 SERPL-SCNC: 28 MMOL/L
CREAT SERPL-MCNC: 0.88 MG/DL
EGFR: 118 ML/MIN/1.73M2
EOSINOPHIL # BLD AUTO: 0.02 K/UL
EOSINOPHIL NFR BLD AUTO: 0.4 %
GLUCOSE SERPL-MCNC: 101 MG/DL
HCT VFR BLD CALC: 41.3 %
HGB BLD-MCNC: 13.3 G/DL
IMM GRANULOCYTES NFR BLD AUTO: 0.4 %
LYMPHOCYTES # BLD AUTO: 0.94 K/UL
LYMPHOCYTES NFR BLD AUTO: 18.1 %
MAN DIFF?: NORMAL
MCHC RBC-ENTMCNC: 27.4 PG
MCHC RBC-ENTMCNC: 32.2 GM/DL
MCV RBC AUTO: 85 FL
MONOCYTES # BLD AUTO: 0.53 K/UL
MONOCYTES NFR BLD AUTO: 10.2 %
NEUTROPHILS # BLD AUTO: 3.67 K/UL
NEUTROPHILS NFR BLD AUTO: 70.5 %
PLATELET # BLD AUTO: 400 K/UL
POTASSIUM SERPL-SCNC: 4.4 MMOL/L
PROT SERPL-MCNC: 8 G/DL
RBC # BLD: 4.86 M/UL
RBC # FLD: 13.4 %
SODIUM SERPL-SCNC: 138 MMOL/L
WBC # FLD AUTO: 5.2 K/UL

## 2023-04-06 ENCOUNTER — APPOINTMENT (OUTPATIENT)
Dept: PULMONOLOGY | Facility: CLINIC | Age: 33
End: 2023-04-06
Payer: COMMERCIAL

## 2023-04-06 ENCOUNTER — NON-APPOINTMENT (OUTPATIENT)
Age: 33
End: 2023-04-06

## 2023-04-06 VITALS
DIASTOLIC BLOOD PRESSURE: 81 MMHG | OXYGEN SATURATION: 97 % | WEIGHT: 116 LBS | SYSTOLIC BLOOD PRESSURE: 117 MMHG | BODY MASS INDEX: 18.87 KG/M2 | HEIGHT: 65.75 IN | RESPIRATION RATE: 14 BRPM | TEMPERATURE: 97.6 F | HEART RATE: 80 BPM

## 2023-04-06 DIAGNOSIS — D86.0 SARCOIDOSIS OF LUNG: ICD-10-CM

## 2023-04-06 PROCEDURE — 36415 COLL VENOUS BLD VENIPUNCTURE: CPT

## 2023-04-06 PROCEDURE — 94618 PULMONARY STRESS TESTING: CPT

## 2023-04-06 PROCEDURE — ZZZZZ: CPT

## 2023-04-06 PROCEDURE — 99205 OFFICE O/P NEW HI 60 MIN: CPT | Mod: 25

## 2023-04-07 NOTE — REVIEW OF SYSTEMS
[Cough] : cough [Negative] : Neurologic [Fever] : no fever [Dry Eyes] : no dry eyes [Chest Discomfort] : no chest discomfort [Hay Fever] : no hay fever [GERD] : no gerd [Nocturia] : no nocturia [Arthralgias] : no arthralgias [Raynaud] : no raynaud [Anemia] : no anemia [Anxiety] : no anxiety [Obesity] : no obesity

## 2023-04-07 NOTE — HISTORY OF PRESENT ILLNESS
[TextBox_4] : Patient is a 33-year-old gentleman------ who recently had biopsy of his appendicular mass which is reported as noncaseating granulomas--- further work-up also revealed nodules in the lung--a provisional diagnosis of sarcoidosis has been entertained he is referred here for pulmonary opinion --------- no history of any cardiac involvement, ocular liver involvement ----\par No history of hypercalcemia--- no history of any LFT abnormalities no history of any pulmonary hypertension -----no history of any joint pains noticed any skin lesions \par \par Non-smoker ------- weight has been stable ---\par pft-pending\par echo-pending\par \par - CT ABDOMEN AND PELVIS OC IC---EXAM: [id number] - CT CHEST IC -\par PROCEDURE DATE: 04/04/2023\par FINDINGS:\par CHEST:\par LUNGS AND LARGE AIRWAYS: Patent central airways. Small bilateral upper lobe predominant pulmonary micronodules with studding of the major fissures and findings suggesting a perilymphatic distribution. Bilateral coalescent pulmonary nodules largest measuring up to 2.5 x 2.3 cm in the left pulmonary apex (2:13) and 1.0 x 1.1 cm in the right lower lobe (2:40). Confluent paramediastinal masslike consolidation in the left upper lobe measuring up to 4.8 x 2.2 cm.\par PLEURA: No pleural effusion.\par VESSELS: Within normal limits.\par HEART: Heart size is normal. No pericardial effusion.\par MEDIASTINUM AND PAT: Confluent noncalcified mediastinal and hilar adenopathy. For reference:\par * Precarinal lymph node measures 1.2 x 1.2 cm (2:30)\par * Right hilar lymph node measures 1.6 x 1.0 cm (2:35)\par CHEST WALL AND LOWER NECK: Within normal limits.\par \par ABDOMEN AND PELVIS:\par LIVER: Innumerable small hypoenhancing lesions in the liver largest measuring up to 6 mm.\par BILE DUCTS: Normal caliber.\par GALLBLADDER: Within normal limits.\par SPLEEN: Innumerable splenic lesions, largest measuring 1.3 cm.\par PANCREAS: Within normal limits.\par ADRENALS: Within normal limits.\par KIDNEYS/URETERS: Subcentimeter hypodensity in the mid to lower pole the right kidney too small to characterize.\par \par BLADDER: Within normal limits.\par REPRODUCTIVE ORGANS: Prostate within normal limits.\par \par BOWEL: No bowel obstruction. Appendix is not visualized.\par PERITONEUM: No ascites.\par VESSELS: Within normal limits.\par RETROPERITONEUM/LYMPH NODES:\par * Enlarged aortocaval lymph node measuring 1.2 x 1.1 cm (2:101).\par * Bilateral enlarged inguinal lymph nodes measuring up to 1.6 x 1.1 cm on the right and 1.5 x 1.0 cm on the left.\par ABDOMINAL WALL: Within normal limits.\par BONES: Lytic lesion in the right iliac bone measuring 1.1 cm, and additional regions of ill-defined lucency in the bilateral iliac bone corresponding with regions of abnormal signal and enhancement on prior MRI.\par \par IMPRESSION:\par * Pulmonary and mediastinal findings highly suggestive of sarcoidosis.\par * Left upper lobe appear mediastinal masslike consolidation is slightly atypical in location for progressive massive fibrosis. Comparison to prior imaging would be helpful, if available.\par * Innumerable liver and splenic hypodensities, in keeping with findings of sarcoid.\par * Mild retroperitoneal and inguinal adenopathy.\par * Lytic lesion in the right iliac bone measuring 1.1 cm and regions of ill-defined lucency in the bilateral iliac bones likely reflect bony manifestations of sarcoid.

## 2023-04-07 NOTE — PHYSICAL EXAM
[No Acute Distress] : no acute distress [Normal Oropharynx] : normal oropharynx [III] : Mallampati Class: III [Normal Appearance] : normal appearance [Normal S1, S2] : normal s1, s2 [No Resp Distress] : no resp distress [No Abnormalities] : no abnormalities [Benign] : benign [No Clubbing] : no clubbing [Normal Gait] : normal gait [Normal Color/ Pigmentation] : normal color/ pigmentation [No Focal Deficits] : no focal deficits [Oriented x3] : oriented x3

## 2023-04-07 NOTE — ASSESSMENT
[FreeTextEntry1] : Assessment and plan  -------- 33-year-old gentleman------ who recently had biopsy of his appendicular mass which is reported as noncaseating granulomas--- further work-up also revealed nodules in the lung--a provisional diagnosis of sarcoidosis \par \par 1--- sarcoidosis with significant extrapulmonary involvement including abdominal and genitourinary tract------- will start him on prednisone 10 mg p.o. daily for now to see if that helps with his cough-----he ultimately may need methotrexate or Remicade   \par 2 pft\par 3 echo\par 4 CT scan of the lung nodules-- - --- will need biopsy thoracoscopic/bronchoscopic/or IR guided of the pulmonary nodules--- in order to rule out any other process particularly any malignant process\par 5--- Labs\par \par Follow-up in 4 to 6 weeks time\par

## 2023-04-10 LAB — ACE BLD-CCNC: 144 U/L

## 2023-04-16 LAB
M TB IFN-G BLD-IMP: NEGATIVE
QUANTIFERON TB PLUS MITOGEN MINUS NIL: 2.11 IU/ML
QUANTIFERON TB PLUS NIL: 0.05 IU/ML
QUANTIFERON TB PLUS TB1 MINUS NIL: 0.02 IU/ML
QUANTIFERON TB PLUS TB2 MINUS NIL: 0.03 IU/ML

## 2023-05-11 ENCOUNTER — APPOINTMENT (OUTPATIENT)
Dept: PULMONOLOGY | Facility: CLINIC | Age: 33
End: 2023-05-11

## 2023-05-13 ENCOUNTER — APPOINTMENT (OUTPATIENT)
Dept: NUCLEAR MEDICINE | Facility: IMAGING CENTER | Age: 33
End: 2023-05-13

## 2023-08-23 LAB
HBV SURFACE AG SER QL: NONREACTIVE
HCV AB SER QL: NONREACTIVE
HCV S/CO RATIO: 0.11 S/CO
HIV1+2 AB SPEC QL IA.RAPID: NONREACTIVE
T PALLIDUM AB SER QL IA: NEGATIVE

## 2023-08-24 LAB
C TRACH RRNA SPEC QL NAA+PROBE: NOT DETECTED
N GONORRHOEA RRNA SPEC QL NAA+PROBE: NOT DETECTED
SOURCE AMPLIFICATION: NORMAL

## 2024-01-10 LAB
C TRACH RRNA SPEC QL NAA+PROBE: NOT DETECTED
HBV SURFACE AG SER QL: NONREACTIVE
HCV AB SER QL: NONREACTIVE
HCV S/CO RATIO: 0.13 S/CO
HIV1+2 AB SPEC QL IA.RAPID: NONREACTIVE
N GONORRHOEA RRNA SPEC QL NAA+PROBE: NOT DETECTED
SOURCE AMPLIFICATION: NORMAL
T PALLIDUM AB SER QL IA: NEGATIVE

## 2024-01-12 LAB
HSV 1 IGG TYPE-SPECIFIC AB: 39.7 INDEX
HSV 2 IGG TYPE-SPECIFIC AB: <0.9 INDEX

## 2024-01-17 ENCOUNTER — APPOINTMENT (OUTPATIENT)
Dept: OTOLARYNGOLOGY | Facility: CLINIC | Age: 34
End: 2024-01-17
Payer: COMMERCIAL

## 2024-01-17 VITALS — WEIGHT: 135 LBS | HEIGHT: 66 IN | BODY MASS INDEX: 21.69 KG/M2

## 2024-01-17 VITALS
DIASTOLIC BLOOD PRESSURE: 66 MMHG | SYSTOLIC BLOOD PRESSURE: 110 MMHG | HEART RATE: 75 BPM | RESPIRATION RATE: 17 BRPM | OXYGEN SATURATION: 96 %

## 2024-01-17 DIAGNOSIS — J31.0 CHRONIC RHINITIS: ICD-10-CM

## 2024-01-17 PROCEDURE — 31231 NASAL ENDOSCOPY DX: CPT

## 2024-01-17 PROCEDURE — 99203 OFFICE O/P NEW LOW 30 MIN: CPT | Mod: 25

## 2024-01-17 RX ORDER — AZELASTINE HYDROCHLORIDE 137 UG/1
137 SPRAY, METERED NASAL
Qty: 3 | Refills: 2 | Status: ACTIVE | COMMUNITY
Start: 2024-01-17 | End: 1900-01-01

## 2024-01-17 RX ORDER — PREDNISONE 10 MG/1
10 TABLET ORAL DAILY
Qty: 30 | Refills: 3 | Status: COMPLETED | COMMUNITY
Start: 2023-04-06 | End: 2024-01-17

## 2024-01-17 RX ORDER — BUDESONIDE AND FORMOTEROL FUMARATE DIHYDRATE 80; 4.5 UG/1; UG/1
80-4.5 AEROSOL RESPIRATORY (INHALATION) TWICE DAILY
Qty: 3 | Refills: 3 | Status: COMPLETED | COMMUNITY
Start: 2022-08-12 | End: 2024-01-17

## 2024-01-17 RX ORDER — FLUTICASONE PROPIONATE 50 UG/1
50 SPRAY, METERED NASAL TWICE DAILY
Qty: 1 | Refills: 6 | Status: ACTIVE | COMMUNITY
Start: 2024-01-17 | End: 1900-01-01

## 2024-01-17 RX ORDER — NEBULIZER ACCESSORIES
KIT MISCELLANEOUS
Qty: 3 | Refills: 5 | Status: COMPLETED | COMMUNITY
Start: 2022-10-06 | End: 2024-01-17

## 2024-01-17 RX ORDER — FLUTICASONE PROPIONATE AND SALMETEROL 250; 50 UG/1; UG/1
250-50 POWDER RESPIRATORY (INHALATION)
Qty: 1 | Refills: 5 | Status: COMPLETED | OUTPATIENT
Start: 2022-10-06 | End: 2024-01-17

## 2024-01-17 RX ORDER — ALBUTEROL SULFATE 2.5 MG/3ML
(2.5 MG/3ML) SOLUTION RESPIRATORY (INHALATION) EVERY 6 HOURS
Qty: 3 | Refills: 5 | Status: COMPLETED | COMMUNITY
Start: 2022-10-06 | End: 2024-01-17

## 2024-01-26 NOTE — ASSESSMENT
[FreeTextEntry1] : 33 year old male with CRSsNP we discussed azelastine and flonase BID regular use.

## 2024-01-26 NOTE — HISTORY OF PRESENT ILLNESS
[de-identified] : 33year old male presents with nasal congestion and clogged ears  Reports intermittent nasal congestion and clogged ears for over 3-4 months  Reports occasional post nasal drip Has used Flonase in the past but was not consistent.  Denies sinus pain/pressure, post nasal drip, nasal discharge or recent sinus infections  Denies hx of sinus infections  Patient denies otalgia, otorrhea, recent ear infections, hearing loss, tinnitus, dizziness, vertigo, headaches related to hearing.  Patient denies dysphagia, odynophagia, dyspnea, dysphonia and throat pain.  No hx of smoking.

## 2024-01-26 NOTE — CONSULT LETTER
[Dear  ___] : Dear  [unfilled], [Consult Letter:] : I had the pleasure of evaluating your patient, [unfilled]. [Please see my note below.] : Please see my note below. [Consult Closing:] : Thank you very much for allowing me to participate in the care of this patient.  If you have any questions, please do not hesitate to contact me. [Sincerely,] : Sincerely, [FreeTextEntry2] : Denzel Freeman MD  [FreeTextEntry3] : Claritza Yang MD [facility] Department of Otolaryngology [address] [place] [address] [phone]

## 2024-01-26 NOTE — PROCEDURE
[FreeTextEntry1] : nasal endoscopy [FreeTextEntry2] : sinus congestion [FreeTextEntry3] : Procedure: nasal endoscopy   Pre-operative diagnosis:   Indication: Anterior rhinoscopy insufficient to diagnose pathology  Details: After decongestant and lidocaine was sprayed in the bilateral nasal cavities, a flexible laryngoscope was inserted into the right nares. The nasal cavity, middle meatus, ETO, nasopharynx, and glottis were visualized. The endoscope was then inserted into the left nares and the nasal cavity, middle meatus, and ETO was visualized. The patient tolerated procedure well.  Findings: BITH middle meatus edema

## 2024-01-26 NOTE — REASON FOR VISIT
[Initial Evaluation] : an initial evaluation for [FreeTextEntry2] : nasal congestion and clogged ears

## 2024-02-26 PROBLEM — N50.812 PAIN IN LEFT TESTICLE: Status: RESOLVED | Noted: 2022-04-22 | Resolved: 2024-02-26

## 2024-03-04 ENCOUNTER — APPOINTMENT (OUTPATIENT)
Dept: INTERNAL MEDICINE | Facility: CLINIC | Age: 34
End: 2024-03-04

## 2024-03-04 DIAGNOSIS — N50.812 LEFT TESTICULAR PAIN: ICD-10-CM

## 2024-03-18 RX ORDER — FLUCONAZOLE 150 MG/1
150 TABLET ORAL
Qty: 2 | Refills: 1 | Status: ACTIVE | COMMUNITY
Start: 2024-02-15 | End: 1900-01-01

## 2024-04-18 ENCOUNTER — APPOINTMENT (OUTPATIENT)
Dept: INTERNAL MEDICINE | Facility: CLINIC | Age: 34
End: 2024-04-18
Payer: COMMERCIAL

## 2024-04-18 ENCOUNTER — OUTPATIENT (OUTPATIENT)
Dept: OUTPATIENT SERVICES | Facility: HOSPITAL | Age: 34
LOS: 1 days | End: 2024-04-18
Payer: COMMERCIAL

## 2024-04-18 VITALS
RESPIRATION RATE: 14 BRPM | HEART RATE: 74 BPM | BODY MASS INDEX: 20.34 KG/M2 | SYSTOLIC BLOOD PRESSURE: 100 MMHG | WEIGHT: 126 LBS | DIASTOLIC BLOOD PRESSURE: 60 MMHG

## 2024-04-18 DIAGNOSIS — Z11.3 ENCOUNTER FOR SCREENING FOR INFECTIONS WITH A PREDOMINANTLY SEXUAL MODE OF TRANSMISSION: ICD-10-CM

## 2024-04-18 DIAGNOSIS — R63.0 ANOREXIA: ICD-10-CM

## 2024-04-18 DIAGNOSIS — M25.539 PAIN IN UNSPECIFIED WRIST: ICD-10-CM

## 2024-04-18 DIAGNOSIS — I10 ESSENTIAL (PRIMARY) HYPERTENSION: ICD-10-CM

## 2024-04-18 DIAGNOSIS — Z00.00 ENCOUNTER FOR GENERAL ADULT MEDICAL EXAMINATION W/OUT ABNORMAL FINDINGS: ICD-10-CM

## 2024-04-18 PROCEDURE — 99395 PREV VISIT EST AGE 18-39: CPT

## 2024-04-18 PROCEDURE — G0463: CPT

## 2024-04-18 NOTE — HEALTH RISK ASSESSMENT
[Audit-CScore] : 1 [QLU6Clyyz] : 0 [Change in mental status noted] : No change in mental status noted [Language] : denies difficulty with language [Behavior] : denies difficulty with behavior [Learning/Retaining New Information] : denies difficulty learning/retaining new information [Handling Complex Tasks] : denies difficulty handling complex tasks [Reasoning] : denies difficulty with reasoning [Spatial Ability and Orientation] : denies difficulty with spatial ability and orientation [Reports changes in hearing] : Reports no changes in hearing [Reports changes in vision] : Reports no changes in vision [Reports changes in dental health] : Reports no changes in dental health

## 2024-04-18 NOTE — HISTORY OF PRESENT ILLNESS
[FreeTextEntry1] : Mr. SAMUELS is here for an annual physical examination and assessment. [de-identified] : He generally feels well with no specific complaints. His recent health has been good. Asthma well controlled.   Denies headache, dizziness. Denies rash, fatigue, fever, weight loss, anorexia. Denies cough, wheezing. Denies CP, SOB, VASQUEZ, edema, palpitations. Denies abdominal pain, N/V/D/C. Denies BRBPR, melena, dysphagia. Denies dysuria, frequency, hematuria, hesitancy. Denies weakness, numbness, gait instability.

## 2024-04-23 DIAGNOSIS — Z00.00 ENCOUNTER FOR GENERAL ADULT MEDICAL EXAMINATION WITHOUT ABNORMAL FINDINGS: ICD-10-CM

## 2024-04-23 DIAGNOSIS — Z11.3 ENCOUNTER FOR SCREENING FOR INFECTIONS WITH A PREDOMINANTLY SEXUAL MODE OF TRANSMISSION: ICD-10-CM

## 2024-05-05 DIAGNOSIS — F90.8 ATTENTION-DEFICIT HYPERACTIVITY DISORDER, OTHER TYPE: ICD-10-CM

## 2024-05-08 DIAGNOSIS — J30.9 ALLERGIC RHINITIS, UNSPECIFIED: ICD-10-CM

## 2024-05-08 RX ORDER — DESLORATADINE AND PSEUDOEPHEDRINE SULFATE 2.5; 12 MG/1; MG/1
2.5-12 TABLET, EXTENDED RELEASE ORAL
Qty: 180 | Refills: 3 | Status: ACTIVE | COMMUNITY
Start: 2024-05-08 | End: 1900-01-01

## 2024-06-03 RX ORDER — DEXTROAMPHETAMINE SACCHARATE, AMPHETAMINE ASPARTATE MONOHYDRATE, DEXTROAMPHETAMINE SULFATE AND AMPHETAMINE SULFATE 2.5; 2.5; 2.5; 2.5 MG/1; MG/1; MG/1; MG/1
10 CAPSULE, EXTENDED RELEASE ORAL
Qty: 30 | Refills: 0 | Status: ACTIVE | COMMUNITY
Start: 2024-05-05 | End: 1900-01-01

## 2024-08-07 ENCOUNTER — APPOINTMENT (OUTPATIENT)
Dept: DERMATOLOGY | Facility: CLINIC | Age: 34
End: 2024-08-07

## 2024-08-07 PROBLEM — R21 RASH: Status: ACTIVE | Noted: 2024-08-07

## 2024-08-07 PROBLEM — D48.5 NEOPLASM OF UNCERTAIN BEHAVIOR OF SKIN: Status: ACTIVE | Noted: 2024-08-07

## 2024-08-07 PROBLEM — L28.1 PRURIGO NODULARIS: Status: ACTIVE | Noted: 2024-08-07

## 2024-08-07 PROCEDURE — 11900 INJECT SKIN LESIONS </W 7: CPT

## 2024-08-07 PROCEDURE — 99204 OFFICE O/P NEW MOD 45 MIN: CPT | Mod: 25

## 2024-08-07 NOTE — HISTORY OF PRESENT ILLNESS
[FreeTextEntry1] : rash [de-identified] : 34 year old male. rash left lower leg x months. itchy. also spot on left nostril that he scratches at.

## 2024-08-07 NOTE — ASSESSMENT
[FreeTextEntry1] : rash favor eczematous vs less likely fungal cutaneous sarcoid in DDx given hx sarcoid lung will treat with betamethasone mixed with keto BIDx 2 weeks; SED if no improvement, will bx  nose NUB favor prurigo over sarcoid given area resolves when he does not rub Risks and benefits were discussed including including atrophy, discoloration Intralesional triamcinolone, concentration   2.5 mg/cc; Total volume     0.1  cc Sites: 1  f/u PRN

## 2024-12-16 ENCOUNTER — APPOINTMENT (OUTPATIENT)
Dept: INTERNAL MEDICINE | Facility: CLINIC | Age: 34
End: 2024-12-16

## 2024-12-16 ENCOUNTER — OUTPATIENT (OUTPATIENT)
Dept: OUTPATIENT SERVICES | Facility: HOSPITAL | Age: 34
LOS: 1 days | End: 2024-12-16

## 2024-12-16 VITALS
OXYGEN SATURATION: 96 % | HEART RATE: 82 BPM | BODY MASS INDEX: 21.79 KG/M2 | DIASTOLIC BLOOD PRESSURE: 70 MMHG | TEMPERATURE: 98.8 F | SYSTOLIC BLOOD PRESSURE: 110 MMHG | WEIGHT: 135 LBS

## 2024-12-16 DIAGNOSIS — J04.0 ACUTE LARYNGITIS: ICD-10-CM

## 2024-12-16 DIAGNOSIS — I10 ESSENTIAL (PRIMARY) HYPERTENSION: ICD-10-CM

## 2024-12-16 DIAGNOSIS — H10.30 UNSPECIFIED ACUTE CONJUNCTIVITIS, UNSPECIFIED EYE: ICD-10-CM

## 2024-12-16 PROCEDURE — G0463: CPT

## 2024-12-16 PROCEDURE — 99213 OFFICE O/P EST LOW 20 MIN: CPT | Mod: GC

## 2024-12-16 RX ORDER — TOBRAMYCIN 3 MG/ML
0.3 SOLUTION/ DROPS OPHTHALMIC 4 TIMES DAILY
Qty: 1 | Refills: 0 | Status: ACTIVE | COMMUNITY
Start: 2024-12-16 | End: 1900-01-01

## 2024-12-16 RX ORDER — FLUTICASONE PROPIONATE 50 UG/1
50 SPRAY, METERED NASAL TWICE DAILY
Qty: 1 | Refills: 2 | Status: ACTIVE | COMMUNITY
Start: 2024-12-16 | End: 1900-01-01

## 2024-12-19 PROBLEM — J04.0 LARYNGITIS: Status: ACTIVE | Noted: 2024-12-19 | Resolved: 2025-01-18

## 2024-12-19 PROBLEM — H10.30 ACUTE CONJUNCTIVITIS, UNSPECIFIED ACUTE CONJUNCTIVITIS TYPE, UNSPECIFIED LATERALITY: Status: ACTIVE | Noted: 2024-12-16 | Resolved: 2024-12-30

## 2025-03-21 DIAGNOSIS — J45.909 UNSPECIFIED ASTHMA, UNCOMPLICATED: ICD-10-CM

## 2025-03-21 RX ORDER — AZITHROMYCIN 250 MG/1
250 TABLET, FILM COATED ORAL
Qty: 1 | Refills: 0 | Status: ACTIVE | COMMUNITY
Start: 2025-03-21 | End: 1900-01-01

## 2025-03-21 RX ORDER — PROMETHAZINE HYDROCHLORIDE AND DEXTROMETHORPHAN HYDROBROMIDE ORAL SOLUTION 15; 6.25 MG/5ML; MG/5ML
6.25-15 SOLUTION ORAL
Qty: 1 | Refills: 0 | Status: ACTIVE | COMMUNITY
Start: 2025-03-21 | End: 1900-01-01

## 2025-04-21 ENCOUNTER — APPOINTMENT (OUTPATIENT)
Dept: INTERNAL MEDICINE | Facility: CLINIC | Age: 35
End: 2025-04-21

## 2025-07-02 ENCOUNTER — OUTPATIENT (OUTPATIENT)
Dept: OUTPATIENT SERVICES | Facility: HOSPITAL | Age: 35
LOS: 1 days | End: 2025-07-02
Payer: COMMERCIAL

## 2025-07-02 ENCOUNTER — APPOINTMENT (OUTPATIENT)
Dept: INTERNAL MEDICINE | Facility: CLINIC | Age: 35
End: 2025-07-02
Payer: COMMERCIAL

## 2025-07-02 DIAGNOSIS — I10 ESSENTIAL (PRIMARY) HYPERTENSION: ICD-10-CM

## 2025-07-02 PROCEDURE — 99395 PREV VISIT EST AGE 18-39: CPT

## 2025-07-02 PROCEDURE — G0463: CPT

## 2025-07-03 VITALS
RESPIRATION RATE: 14 BRPM | DIASTOLIC BLOOD PRESSURE: 60 MMHG | WEIGHT: 143 LBS | HEART RATE: 74 BPM | BODY MASS INDEX: 23.08 KG/M2 | SYSTOLIC BLOOD PRESSURE: 108 MMHG

## 2025-07-08 DIAGNOSIS — Z11.3 ENCOUNTER FOR SCREENING FOR INFECTIONS WITH A PREDOMINANTLY SEXUAL MODE OF TRANSMISSION: ICD-10-CM

## 2025-07-08 DIAGNOSIS — Z00.00 ENCOUNTER FOR GENERAL ADULT MEDICAL EXAMINATION WITHOUT ABNORMAL FINDINGS: ICD-10-CM

## 2025-07-08 DIAGNOSIS — D86.0 SARCOIDOSIS OF LUNG: ICD-10-CM

## (undated) DEVICE — APPLICATOR Q TIP 6" WOOD STEM

## (undated) DEVICE — GOWN LG

## (undated) DEVICE — ELCTR BOVIE TIP BLADE INSULATED 2.8" EDGE WITH SAFETY

## (undated) DEVICE — GLV 7.5 PROTEXIS (CREAM) MICRO

## (undated) DEVICE — ELCTR GROUNDING PAD INFANT COVIDIEN

## (undated) DEVICE — GLV 8 PROTEXIS (CREAM) MICRO

## (undated) DEVICE — PACK MINOR WITH LAP

## (undated) DEVICE — WARMING BLANKET FULL ADULT

## (undated) DEVICE — SOL IRR POUR H2O 500ML

## (undated) DEVICE — DRAPE MINOR PROCEDURE

## (undated) DEVICE — PREP BETADINE KIT

## (undated) DEVICE — DRSG STERISTRIPS 0.5 X 4"

## (undated) DEVICE — VENODYNE/SCD SLEEVE CALF MEDIUM

## (undated) DEVICE — DRAPE 3/4 SHEET 52X76"

## (undated) DEVICE — POSITIONER STRAP ARMBOARD VELCRO TS-30

## (undated) DEVICE — BLADE SURGICAL #15 CARBON